# Patient Record
Sex: MALE | Race: WHITE | NOT HISPANIC OR LATINO | Employment: OTHER | URBAN - NONMETROPOLITAN AREA
[De-identification: names, ages, dates, MRNs, and addresses within clinical notes are randomized per-mention and may not be internally consistent; named-entity substitution may affect disease eponyms.]

---

## 2017-08-16 ENCOUNTER — TRANSCRIBE ORDERS (OUTPATIENT)
Dept: ADMINISTRATIVE | Facility: HOSPITAL | Age: 62
End: 2017-08-16

## 2017-08-16 DIAGNOSIS — R07.9 CHEST PAIN, UNSPECIFIED: Primary | ICD-10-CM

## 2017-08-16 DIAGNOSIS — R93.89 ABNORMAL CXR: ICD-10-CM

## 2017-08-21 ENCOUNTER — HOSPITAL ENCOUNTER (OUTPATIENT)
Dept: CT IMAGING | Facility: HOSPITAL | Age: 62
Discharge: HOME | End: 2017-08-21
Attending: INTERNAL MEDICINE | Admitting: INTERNAL MEDICINE

## 2017-08-21 DIAGNOSIS — R93.89 ABNORMAL CXR: ICD-10-CM

## 2017-08-21 PROCEDURE — 71250 CT THORAX DX C-: CPT

## 2017-08-21 PROCEDURE — 71250 CT THORAX DX C-: CPT | Performed by: RADIOLOGY

## 2017-08-30 ENCOUNTER — HOSPITAL ENCOUNTER (OUTPATIENT)
Dept: NUCLEAR MEDICINE | Facility: HOSPITAL | Age: 62
Discharge: HOME | End: 2017-08-30
Attending: INTERNAL MEDICINE

## 2017-08-30 ENCOUNTER — HOSPITAL ENCOUNTER (OUTPATIENT)
Dept: CARDIOLOGY | Facility: HOSPITAL | Age: 62
Discharge: HOME | End: 2017-08-30
Attending: INTERNAL MEDICINE

## 2017-08-30 VITALS — WEIGHT: 218 LBS | BODY MASS INDEX: 28.89 KG/M2 | HEIGHT: 73 IN

## 2017-08-30 DIAGNOSIS — R07.9 CHEST PAIN, UNSPECIFIED: ICD-10-CM

## 2017-08-30 PROCEDURE — 93306 TTE W/DOPPLER COMPLETE: CPT | Performed by: INTERNAL MEDICINE

## 2017-08-30 PROCEDURE — A9500 TC99M SESTAMIBI: HCPCS | Performed by: INTERNAL MEDICINE

## 2017-08-30 PROCEDURE — 0 TECHNETIUM SESTAMIBI: Performed by: INTERNAL MEDICINE

## 2017-08-30 PROCEDURE — 93306 TTE W/DOPPLER COMPLETE: CPT

## 2017-08-30 PROCEDURE — 78452 HT MUSCLE IMAGE SPECT MULT: CPT | Performed by: INTERNAL MEDICINE

## 2017-08-30 PROCEDURE — 93017 CV STRESS TEST TRACING ONLY: CPT

## 2017-08-30 PROCEDURE — 93018 CV STRESS TEST I&R ONLY: CPT | Performed by: INTERNAL MEDICINE

## 2017-08-30 PROCEDURE — 78452 HT MUSCLE IMAGE SPECT MULT: CPT

## 2017-08-30 RX ORDER — TRAMADOL HYDROCHLORIDE 50 MG/1
50 TABLET ORAL EVERY 6 HOURS PRN
COMMUNITY

## 2017-08-30 RX ORDER — LISINOPRIL 20 MG/1
20 TABLET ORAL DAILY
COMMUNITY

## 2017-08-30 RX ADMIN — TECHNETIUM TC-99M SESTAMIBI 1 DOSE: 1 INJECTION INTRAVENOUS at 08:25

## 2017-08-30 RX ADMIN — TECHNETIUM TC-99M SESTAMIBI 1 DOSE: 1 INJECTION INTRAVENOUS at 09:55

## 2017-08-31 LAB
BH CV ECHO MEAS - % IVS THICK: 60 %
BH CV ECHO MEAS - % LVPW THICK: 51.9 %
BH CV ECHO MEAS - ACS: 2.1 CM
BH CV ECHO MEAS - AO ROOT AREA (BSA CORRECTED): 1.6
BH CV ECHO MEAS - AO ROOT AREA: 10.6 CM^2
BH CV ECHO MEAS - AO ROOT DIAM: 3.7 CM
BH CV ECHO MEAS - BSA(HAYCOCK): 2.3 M^2
BH CV ECHO MEAS - BSA: 2.2 M^2
BH CV ECHO MEAS - BZI_BMI: 28.8 KILOGRAMS/M^2
BH CV ECHO MEAS - BZI_METRIC_HEIGHT: 185.4 CM
BH CV ECHO MEAS - BZI_METRIC_WEIGHT: 98.9 KG
BH CV ECHO MEAS - CONTRAST EF 4CH: 50 ML/M^2
BH CV ECHO MEAS - EDV(CUBED): 131.2 ML
BH CV ECHO MEAS - EDV(MOD-SP4): 102 ML
BH CV ECHO MEAS - EDV(TEICH): 122.8 ML
BH CV ECHO MEAS - EF(CUBED): 80.6 %
BH CV ECHO MEAS - EF(MOD-SP4): 50 %
BH CV ECHO MEAS - EF(TEICH): 72.8 %
BH CV ECHO MEAS - ESV(CUBED): 25.5 ML
BH CV ECHO MEAS - ESV(MOD-SP4): 51 ML
BH CV ECHO MEAS - ESV(TEICH): 33.4 ML
BH CV ECHO MEAS - FS: 42.1 %
BH CV ECHO MEAS - IVS/LVPW: 0.93
BH CV ECHO MEAS - IVSD: 1 CM
BH CV ECHO MEAS - IVSS: 1.6 CM
BH CV ECHO MEAS - LA DIMENSION: 3.6 CM
BH CV ECHO MEAS - LA/AO: 0.98
BH CV ECHO MEAS - LV DIASTOLIC VOL/BSA (35-75): 45.7 ML/M^2
BH CV ECHO MEAS - LV MASS(C)D: 199.2 GRAMS
BH CV ECHO MEAS - LV MASS(C)DI: 89.3 GRAMS/M^2
BH CV ECHO MEAS - LV MASS(C)S: 178.7 GRAMS
BH CV ECHO MEAS - LV MASS(C)SI: 80.1 GRAMS/M^2
BH CV ECHO MEAS - LV SYSTOLIC VOL/BSA (12-30): 22.8 ML/M^2
BH CV ECHO MEAS - LVIDD: 5.1 CM
BH CV ECHO MEAS - LVIDS: 2.9 CM
BH CV ECHO MEAS - LVLD AP4: 9.9 CM
BH CV ECHO MEAS - LVLS AP4: 8.8 CM
BH CV ECHO MEAS - LVOT AREA (M): 4.2 CM^2
BH CV ECHO MEAS - LVOT AREA: 4 CM^2
BH CV ECHO MEAS - LVOT DIAM: 2.3 CM
BH CV ECHO MEAS - LVPWD: 1.1 CM
BH CV ECHO MEAS - LVPWS: 1.7 CM
BH CV ECHO MEAS - MV A MAX VEL: 76 CM/SEC
BH CV ECHO MEAS - MV E MAX VEL: 91.3 CM/SEC
BH CV ECHO MEAS - MV E/A: 1.2
BH CV ECHO MEAS - PA ACC SLOPE: 593.4 CM/SEC^2
BH CV ECHO MEAS - PA ACC TIME: 0.14 SEC
BH CV ECHO MEAS - PA PR(ACCEL): 14 MMHG
BH CV ECHO MEAS - RAP SYSTOLE: 10 MMHG
BH CV ECHO MEAS - RVDD: 1 CM
BH CV ECHO MEAS - RVSP: 35 MMHG
BH CV ECHO MEAS - SI(CUBED): 47.4 ML/M^2
BH CV ECHO MEAS - SI(MOD-SP4): 22.8 ML/M^2
BH CV ECHO MEAS - SI(TEICH): 40 ML/M^2
BH CV ECHO MEAS - SV(CUBED): 105.7 ML
BH CV ECHO MEAS - SV(MOD-SP4): 51 ML
BH CV ECHO MEAS - SV(TEICH): 89.4 ML
BH CV ECHO MEAS - TR MAX VEL: 249.8 CM/SEC
BH CV NUCLEAR PRIOR STUDY: 3
BH CV STRESS BP STAGE 1: NORMAL
BH CV STRESS BP STAGE 2: NORMAL
BH CV STRESS BP STAGE 3: NORMAL
BH CV STRESS DURATION MIN STAGE 1: 3
BH CV STRESS DURATION MIN STAGE 2: 3
BH CV STRESS DURATION MIN STAGE 3: 3
BH CV STRESS DURATION SEC STAGE 1: 0
BH CV STRESS DURATION SEC STAGE 2: 0
BH CV STRESS DURATION SEC STAGE 3: 0
BH CV STRESS GRADE STAGE 1: 10
BH CV STRESS GRADE STAGE 2: 12
BH CV STRESS GRADE STAGE 3: 14
BH CV STRESS HR STAGE 1: 108
BH CV STRESS HR STAGE 2: 124
BH CV STRESS HR STAGE 3: 142
BH CV STRESS METS STAGE 1: 5
BH CV STRESS METS STAGE 2: 7.5
BH CV STRESS METS STAGE 3: 10
BH CV STRESS PROTOCOL 1: NORMAL
BH CV STRESS RECOVERY BP: NORMAL MMHG
BH CV STRESS RECOVERY HR: 96 BPM
BH CV STRESS SPEED STAGE 1: 1.7
BH CV STRESS SPEED STAGE 2: 2.5
BH CV STRESS SPEED STAGE 3: 3.4
BH CV STRESS STAGE 1: 1
BH CV STRESS STAGE 2: 2
BH CV STRESS STAGE 3: 3
LV EF 2D ECHO EST: 55 %
LV EF NUC BP: 66 %
MAXIMAL PREDICTED HEART RATE: 160 BPM
PERCENT MAX PREDICTED HR: 88.75 %
STRESS BASELINE BP: NORMAL MMHG
STRESS BASELINE HR: 92 BPM
STRESS PERCENT HR: 104 %
STRESS POST ESTIMATED WORKLOAD: 10.1 METS
STRESS POST EXERCISE DUR MIN: 7 MIN
STRESS POST EXERCISE DUR SEC: 30 SEC
STRESS POST PEAK BP: NORMAL MMHG
STRESS POST PEAK HR: 142 BPM
STRESS TARGET HR: 136 BPM

## 2017-09-13 ENCOUNTER — TRANSCRIBE ORDERS (OUTPATIENT)
Dept: ADMINISTRATIVE | Facility: HOSPITAL | Age: 62
End: 2017-09-13

## 2017-09-13 DIAGNOSIS — R93.89 ABNORMAL CXR (CHEST X-RAY): Primary | ICD-10-CM

## 2017-10-16 ENCOUNTER — HOSPITAL ENCOUNTER (OUTPATIENT)
Dept: RESPIRATORY THERAPY | Facility: HOSPITAL | Age: 62
Discharge: HOME | End: 2017-10-16
Attending: INTERNAL MEDICINE | Admitting: INTERNAL MEDICINE

## 2017-10-16 DIAGNOSIS — R93.89 ABNORMAL CXR (CHEST X-RAY): ICD-10-CM

## 2017-10-16 PROCEDURE — 94727 GAS DIL/WSHOT DETER LNG VOL: CPT | Performed by: INTERNAL MEDICINE

## 2017-10-16 PROCEDURE — 94729 DIFFUSING CAPACITY: CPT | Performed by: INTERNAL MEDICINE

## 2017-10-16 PROCEDURE — 94727 GAS DIL/WSHOT DETER LNG VOL: CPT

## 2017-10-16 PROCEDURE — 94729 DIFFUSING CAPACITY: CPT

## 2017-10-16 PROCEDURE — 94060 EVALUATION OF WHEEZING: CPT | Performed by: INTERNAL MEDICINE

## 2017-10-16 PROCEDURE — 94060 EVALUATION OF WHEEZING: CPT

## 2020-09-08 ENCOUNTER — TRANSCRIBE ORDERS (OUTPATIENT)
Dept: ADMINISTRATIVE | Facility: HOSPITAL | Age: 65
End: 2020-09-08

## 2020-09-08 DIAGNOSIS — Z11.59 SPECIAL SCREENING EXAMINATION FOR UNSPECIFIED VIRAL DISEASE: Primary | ICD-10-CM

## 2020-09-09 ENCOUNTER — LAB (OUTPATIENT)
Dept: LAB | Facility: HOSPITAL | Age: 65
End: 2020-09-09

## 2020-09-09 DIAGNOSIS — Z11.59 SPECIAL SCREENING EXAMINATION FOR UNSPECIFIED VIRAL DISEASE: ICD-10-CM

## 2020-09-09 LAB — SARS-COV-2 RNA NOSE QL NAA+PROBE: NOT DETECTED

## 2020-09-09 PROCEDURE — C9803 HOPD COVID-19 SPEC COLLECT: HCPCS

## 2020-09-09 PROCEDURE — U0004 COV-19 TEST NON-CDC HGH THRU: HCPCS

## 2021-03-19 ENCOUNTER — IMMUNIZATION (OUTPATIENT)
Dept: VACCINE CLINIC | Facility: HOSPITAL | Age: 66
End: 2021-03-19

## 2021-03-19 PROCEDURE — 0001A: CPT | Performed by: INTERNAL MEDICINE

## 2021-03-19 PROCEDURE — 91300 HC SARSCOV02 VAC 30MCG/0.3ML IM: CPT | Performed by: INTERNAL MEDICINE

## 2021-04-09 ENCOUNTER — IMMUNIZATION (OUTPATIENT)
Dept: VACCINE CLINIC | Facility: HOSPITAL | Age: 66
End: 2021-04-09

## 2021-04-09 PROCEDURE — 91300 HC SARSCOV02 VAC 30MCG/0.3ML IM: CPT | Performed by: INTERNAL MEDICINE

## 2021-04-09 PROCEDURE — 0002A: CPT | Performed by: INTERNAL MEDICINE

## 2022-04-04 ENCOUNTER — TRANSCRIBE ORDERS (OUTPATIENT)
Dept: ADMINISTRATIVE | Facility: HOSPITAL | Age: 67
End: 2022-04-04

## 2022-04-04 DIAGNOSIS — R01.1 CARDIAC MURMUR: Primary | ICD-10-CM

## 2022-04-08 ENCOUNTER — HOSPITAL ENCOUNTER (OUTPATIENT)
Dept: GENERAL RADIOLOGY | Facility: HOSPITAL | Age: 67
Discharge: HOME | End: 2022-04-08

## 2022-04-08 ENCOUNTER — TRANSCRIBE ORDERS (OUTPATIENT)
Dept: ADMINISTRATIVE | Facility: HOSPITAL | Age: 67
End: 2022-04-08

## 2022-04-08 ENCOUNTER — HOSPITAL ENCOUNTER (OUTPATIENT)
Dept: CARDIOLOGY | Facility: HOSPITAL | Age: 67
Discharge: HOME | End: 2022-04-08

## 2022-04-08 DIAGNOSIS — R09.1 PLEURISY: Primary | ICD-10-CM

## 2022-04-08 DIAGNOSIS — R09.1 PLEURISY: ICD-10-CM

## 2022-04-08 DIAGNOSIS — R01.1 CARDIAC MURMUR: ICD-10-CM

## 2022-04-08 LAB
BH CV ECHO MEAS - ACS: 2 CM
BH CV ECHO MEAS - AO MAX PG: 10.8 MMHG
BH CV ECHO MEAS - AO MEAN PG: 6 MMHG
BH CV ECHO MEAS - AO ROOT DIAM: 3.3 CM
BH CV ECHO MEAS - AO V2 MAX: 164 CM/SEC
BH CV ECHO MEAS - AO V2 VTI: 39.5 CM
BH CV ECHO MEAS - EDV(CUBED): 92 ML
BH CV ECHO MEAS - EDV(MOD-SP4): 88.8 ML
BH CV ECHO MEAS - EF(MOD-SP4): 53.5 %
BH CV ECHO MEAS - ESV(CUBED): 29.1 ML
BH CV ECHO MEAS - ESV(MOD-SP4): 41.3 ML
BH CV ECHO MEAS - FS: 31.9 %
BH CV ECHO MEAS - IVS/LVPW: 1.1 CM
BH CV ECHO MEAS - IVSD: 1.23 CM
BH CV ECHO MEAS - LA DIMENSION: 3.4 CM
BH CV ECHO MEAS - LAT PEAK E' VEL: 10 CM/SEC
BH CV ECHO MEAS - LV DIASTOLIC VOL/BSA (35-75): 39.8 CM2
BH CV ECHO MEAS - LV MASS(C)D: 193.2 GRAMS
BH CV ECHO MEAS - LV SYSTOLIC VOL/BSA (12-30): 18.5 CM2
BH CV ECHO MEAS - LVIDD: 4.5 CM
BH CV ECHO MEAS - LVIDS: 3.1 CM
BH CV ECHO MEAS - LVOT AREA: 4.2 CM2
BH CV ECHO MEAS - LVOT DIAM: 2.3 CM
BH CV ECHO MEAS - LVPWD: 1.12 CM
BH CV ECHO MEAS - MED PEAK E' VEL: 8.8 CM/SEC
BH CV ECHO MEAS - MV A MAX VEL: 104 CM/SEC
BH CV ECHO MEAS - MV E MAX VEL: 121 CM/SEC
BH CV ECHO MEAS - MV E/A: 1.16
BH CV ECHO MEAS - PA ACC TIME: 0.11 SEC
BH CV ECHO MEAS - PA PR(ACCEL): 31.3 MMHG
BH CV ECHO MEAS - RAP SYSTOLE: 10 MMHG
BH CV ECHO MEAS - RVSP: 40.5 MMHG
BH CV ECHO MEAS - SI(MOD-SP4): 21.3 ML/M2
BH CV ECHO MEAS - SV(MOD-SP4): 47.5 ML
BH CV ECHO MEAS - TAPSE (>1.6): 3 CM
BH CV ECHO MEAS - TR MAX PG: 30.5 MMHG
BH CV ECHO MEAS - TR MAX VEL: 275.7 CM/SEC
BH CV ECHO MEASUREMENTS AVERAGE E/E' RATIO: 12.87
BH CV LEA LEFT PERONEAL  DISTAL EDV: 3.4 CM/S
BH CV LEA LEFT PERONEAL  DISTAL PSV: 3.4 CM/S
BH CV LEA LEFT PERONEAL  MID EDV: 3.4 CM/S
BH CV LEA LEFT PERONEAL  MID PSV: 3.4 CM/S
BH CV LEA LEFT PERONEAL  PROX EDV: 3.4 CM/S
BH CV LEA LEFT PERONEAL  PROX PSV: 3.4 CM/S
BH CV LEA LEFT PTA DISTAL EDV: 3.4 CM/S
BH CV LEA LEFT PTA DISTAL PSV: 3.4 CM/S
BH CV LEA LEFT PTA MID EDV: 3.4 CM/S
BH CV LEA LEFT PTA MID PSV: 3.4 CM/S
BH CV LEA LEFT PTA PROX EDV: 3.4 CM/S
BH CV LEA LEFT PTA PROX PSV: 3.4 CM/S
LEFT ATRIUM VOLUME INDEX: 21.7 ML/M2
LV EF 2D ECHO EST: 60 %
MAXIMAL PREDICTED HEART RATE: 155 BPM
STRESS TARGET HR: 132 BPM

## 2022-04-08 PROCEDURE — 93306 TTE W/DOPPLER COMPLETE: CPT | Performed by: INTERNAL MEDICINE

## 2022-04-08 PROCEDURE — 93306 TTE W/DOPPLER COMPLETE: CPT

## 2022-04-08 PROCEDURE — 71046 X-RAY EXAM CHEST 2 VIEWS: CPT | Performed by: RADIOLOGY

## 2022-04-08 PROCEDURE — 71046 X-RAY EXAM CHEST 2 VIEWS: CPT

## 2023-01-03 ENCOUNTER — TRANSCRIBE ORDERS (OUTPATIENT)
Dept: ADMINISTRATIVE | Facility: HOSPITAL | Age: 68
End: 2023-01-03
Payer: COMMERCIAL

## 2023-01-03 DIAGNOSIS — J60 BLACK LUNG DISEASE: Primary | ICD-10-CM

## 2023-01-13 ENCOUNTER — APPOINTMENT (OUTPATIENT)
Dept: RESPIRATORY THERAPY | Facility: HOSPITAL | Age: 68
End: 2023-01-13
Payer: COMMERCIAL

## 2023-10-02 ENCOUNTER — TRANSCRIBE ORDERS (OUTPATIENT)
Dept: ADMINISTRATIVE | Facility: HOSPITAL | Age: 68
End: 2023-10-02
Payer: COMMERCIAL

## 2023-10-02 DIAGNOSIS — N42.89 PROSTATIC MASS: Primary | ICD-10-CM

## 2023-10-14 ENCOUNTER — HOSPITAL ENCOUNTER (OUTPATIENT)
Dept: MRI IMAGING | Facility: HOSPITAL | Age: 68
Discharge: HOME | End: 2023-10-14
Admitting: INTERNAL MEDICINE
Payer: MEDICARE

## 2023-10-14 DIAGNOSIS — N42.89 PROSTATIC MASS: ICD-10-CM

## 2023-10-14 PROCEDURE — A9577 INJ MULTIHANCE: HCPCS | Performed by: INTERNAL MEDICINE

## 2023-10-14 PROCEDURE — 0 GADOBENATE DIMEGLUMINE 529 MG/ML SOLUTION: Performed by: INTERNAL MEDICINE

## 2023-10-14 PROCEDURE — 72197 MRI PELVIS W/O & W/DYE: CPT

## 2023-10-14 RX ADMIN — GADOBENATE DIMEGLUMINE 18 ML: 529 INJECTION, SOLUTION INTRAVENOUS at 18:59

## 2024-01-04 ENCOUNTER — OFFICE VISIT (OUTPATIENT)
Dept: RADIATION ONCOLOGY | Facility: HOSPITAL | Age: 69
End: 2024-01-04
Payer: MEDICARE

## 2024-01-04 ENCOUNTER — HOSPITAL ENCOUNTER (OUTPATIENT)
Dept: RADIATION ONCOLOGY | Facility: HOSPITAL | Age: 69
Setting detail: RADIATION/ONCOLOGY SERIES
Discharge: HOME | End: 2024-01-04
Payer: MEDICARE

## 2024-01-04 VITALS
TEMPERATURE: 97.9 F | BODY MASS INDEX: 27.23 KG/M2 | OXYGEN SATURATION: 95 % | HEART RATE: 75 BPM | SYSTOLIC BLOOD PRESSURE: 162 MMHG | RESPIRATION RATE: 16 BRPM | DIASTOLIC BLOOD PRESSURE: 78 MMHG | WEIGHT: 205.5 LBS | HEIGHT: 73 IN

## 2024-01-04 DIAGNOSIS — C61 PROSTATE CANCER: Primary | ICD-10-CM

## 2024-01-04 RX ORDER — TELMISARTAN 20 MG/1
1 TABLET ORAL DAILY
COMMUNITY
Start: 2023-09-29

## 2024-01-04 RX ORDER — ROSUVASTATIN CALCIUM 20 MG/1
1 TABLET, COATED ORAL 2 TIMES WEEKLY
COMMUNITY
Start: 2023-09-29

## 2024-01-04 RX ORDER — TAMSULOSIN HYDROCHLORIDE 0.4 MG/1
1 CAPSULE ORAL DAILY
COMMUNITY
Start: 2023-09-29

## 2024-01-04 NOTE — PROGRESS NOTES
CONSULTATION NOTE    NAME:      Bereket Andrade  :                                                          1955  DATE OF CONSULTATION:                       24  REQUESTING PHYSICIAN:                   TIMOTHY Bello MD  REASON FOR CONSULTATION:           Further evaluation and management of the patient's adenocarcinoma of the prostate cT1 cN0 M0 [place] 3+4 equal 7 pretreatment PSA 4.8 for consideration of radiation treatments at the request of Dr. Bello.         BRIEF HISTORY:  Bereket Andrade  is a very pleasant 68 y.o. male who was found to have an elevated PSA that was increasing over time.  The patient had PI-RADS MRI scan that showed a lesion at the right apex concerning for PI-RADS 4 lesion.  Patient gland was however 85 g. the patient had a biopsy that came back initially is 1 in 12 cores positive for [place] 4+4 = 8.  The patient's pathology was sent for GPS score was found to be 15.  The patient's pathology was sent for review given the discordant genomic risk and pathology.  The patient was then referred to our clinic to consider indicated treatments.    Patient reports that he does have some flow issues and does have some issues with frequency and hesitancy/intermittency.    The patient does not require medications for erectile dysfunction.  The patient has no issues with his GI tract.    The patient is very anxious and has done a considerable amount of research regarding his diagnosis and treatment options  BMI:  Body mass index is 27.11 kg/m².      Social History     Substance and Sexual Activity   Alcohol Use No       Allergies   Allergen Reactions    Contrast Dye (Echo Or Unknown Ct/Mr)        Social History     Tobacco Use    Smoking status: Former    Smokeless tobacco: Never    Tobacco comments:     Quit    Substance Use Topics    Alcohol use: No    Drug use: No         Past Medical History:   Diagnosis Date    Hyperlipidemia     Hypertension     Prostate cancer   "      family history is not on file.     Past Surgical History:   Procedure Laterality Date    COLONOSCOPY  2020    OTHER SURGICAL HISTORY      knee surgery    OTHER SURGICAL HISTORY      fravtured jaw    OTHER SURGICAL HISTORY  1990    RK (laser surgery)        Review of Systems   Respiratory:  Positive for shortness of breath.     A full 14 point review of systems was performed and was negative except as noted in the HPI.         Objective   VITAL SIGNS:   Vitals:    01/04/24 1050   BP: 162/78   Pulse: 75   Resp: 16   Temp: 97.9 °F (36.6 °C)   TempSrc: Temporal   SpO2: 95%   Weight: 93.2 kg (205 lb 8 oz)   Height: 185.4 cm (73\")   PainSc: 0-No pain      IPSS Questionnaire (AUA-7):  Over the past month…    1)  Incomplete Emptying  How often have you had a sensation of not emptying your bladder?  3 - About half the time   2)  Frequency  How often have you had to urinate less than every two hours? 3 - About half the time   3)  Intermittency  How often have you found you stopped and started again several times when you urinated?  2 - Less than half the time   4) Urgency  How often have you found it difficult to postpone urination?  1 - Less than 1 time in 5   5) Weak Stream  How often have you had a weak urinary stream?  1 - Less than 1 time in 5   6) Straining  How often have you had to push or strain to begin urination?  0 - Not at all   7) Nocturia  How many times did you typically get up at night to urinate?  3 - 3 times   Total Score:  13       Quality of life due to urinary symptoms:  If you were to spend the rest of your life with your urinary condition the way it is now, how would you feel about that? 2-Mostly Satisfied   Urine Leakage (Incontinence) 0-No Leakage     Sexual Health Inventory  Current Status    1)  How do you rate your confidence that you could achieve and keep an erection? 5-Very High   2) When you had erections with sexual stimulation, how often were your erections hard enough for penetration " (entering your partner)? 5-Almost always or always   3)  During sexual intercourse, how often were you able to maintain your erection after you had penetrated (entered) into your partner? 5-Almost always or always   4) During sexual intercourse, how difficult was it to maintain your erection to completion of intercourse? 5-Not difficult   5) When you attempted sexual intercourse, how often was it satisfactory to you? 5-Almost always or always   Total Score: 25       Bowel Health Inventory  Current Status: 0-No problems, no rectal bleeding, no discharge, less then 5 bowel movements a day            KPS       90%    Physical Exam  Constitutional:       General: He is not in acute distress.     Appearance: He is well-developed.   HENT:      Head: Normocephalic and atraumatic.   Eyes:      Conjunctiva/sclera: Conjunctivae normal.      Pupils: Pupils are equal, round, and reactive to light.   Neck:      Trachea: No tracheal deviation.   Pulmonary:      Effort: Pulmonary effort is normal. No respiratory distress.      Breath sounds: No wheezing.   Abdominal:      General: There is no distension.      Palpations: Abdomen is soft.   Genitourinary:     Comments: Deferred as previously performed by Dr. Bello  Musculoskeletal:         General: Normal range of motion.      Cervical back: Normal range of motion.   Skin:     General: Skin is warm and dry.   Neurological:      Mental Status: He is alert.      Cranial Nerves: No cranial nerve deficit.      Coordination: Coordination normal.   Psychiatric:         Behavior: Behavior normal.         Judgment: Judgment normal.              The following portions of the patient's history were reviewed and updated as appropriate: allergies, current medications, past family history, past medical history, past social history, past surgical history, and problem list.  I reviewed the patient's PSAs most recent one being 4.8  I reviewed the patient's MRI scan showing 85 cc gland with a  PI-RADS 4 lesions right apex but no evidence of extracapsular extension or depressed extension  Reviewed the patient's genetic score showing of GPS of 15.  I reviewed the patient's pathology from [place] showing Yola 4 before collate in 1 of 12 cores.  I reviewed the patient's pathology from [facility] showing [place] 3+4 equal 7 disease.    I reviewed Dr. Bello's notes.    Assessment      IMPRESSION:     Patient is a very pleasant 68-year-old gentleman with favorable intermediate risk adenocarcinoma of the prostate with a favorable genetic risk score but a relatively high risk appearing lesion on the PET scan.  The patient clinically has a cT1 cN0 M0 and a low PSA at 4.8.  Patient's trend has been slowly but inevitably increasing PSA.  We discussed in detail an exhaustive list of treatment options for the patient today.  We discussed surgery discussed risk benefits of surgery.  We discussed radiotherapy.  We discussed various types of radiotherapy including but not limited to long course radiotherapy, moderately hypofractionated radiotherapy, SBRT, brachytherapy, combination therapy.  We discussed radiotherapy in combination with ADT.  We discussed the acute and chronic side effects of radiation during treatment.  We compared and contrasted the different treatment styles.  We discussed the long-term side effects of radiotherapy.  We discussed how that may affect his flow and his urinary functioning.  We discussed the effect of radiation on his erectile function.  We compared and contrasted this with surgery.  We discussed ADT as it pertains to the population general.  We discussed ADT in his case.  We enumerated the situations where ADT could potentially helpful and does when it might not be  We discussed that Dr. Bello may be the final determination of what kind, how much and when he received his ADT.  We discussed ADT in combination with SBRT and how we sequence that.  We discussed how we sequence that with  his markers and fiducials and space O AR.  We discussed also how the pathology is obtained.  We discussed intra versus interobserver variability among pathologist.  We discussed the difference in the surgical data versus the radiation data based on the increased amount of pathological information gleaned from surgical excision versus radiation.  We discussed the risk of under sampling and being under staged.  We discussed the limitations pertaining to this sensitivity and specificity of MRI scans we discussed the difficulty of making decisions based on genetic risk scores are often validated in large clinical trials yet.    The patient does have an iodine allergy would not be a candidate for an iodine based space O AR but still received a noniodinated 1.  This is okay as the patient will be candidate for an MRI scan.      We discussed that if he is ultimately interested in SBRT and Dr. Bello thinks needs ADT that we will wait a few months to let his prostate shrink in size before starting the planning.  Ideally the patient will receive his fiducial markers and space O AR after being on ADT for at least a couple of months to allow for maximum downstaging and stability of gland size around the treatments.    We discussed posttreatment monitoring.  We discussed the role of PSA in the posttreatment setting.  We discussed the nuances related to that.  We went over PSA bounces, when they happen and what they mean.  We also discussed specifics related to his plan for follow-up.      I answered their questions related to his care specifically.    The patient would like to discuss his options again with Dr. Bello in light of his new lower pathology specifically as it pertains to ADT.  He is leaning towards SBRT with CyberKnife and will contact us after he considers his options further and makes decision that fits best for him and his risk profile and lifestyle.    In total I spent greater than 120 minutes in direct counseling  with the patient today.    RECOMMENDATIONS:      Adenocarcinoma the prostate  -Favorable intermediate risk  -CT 1 cN0 M0  -[place] 4+4 = 8 initially  -[place] 3+4 equal 7 from [facility]  -Pretreatment PSA 4.8 on 4K score  -PI-RADS MRI shows PI-RADS 4 lesion at the right apex  -GPS score 15  -Iodine allergy   -Not a candidate for contrasted space O AR   -Fortunately is a candidate for an MRI scan and would need MRI planning if this is placed  -The patient is ADT would recommend treatments of ADT prior to fiducial markers and space O AR   -Could then proceed with planning his usual thereafter  -Patient will call us when he reaches a decision  -Patient to get back in with Dr. Bello to discuss further what he would like to do for treatments       Eddie Stone MD      Errors in dictation may reflect use of voice recognition software and not all errors in transcription may have been detected prior to signing.

## 2024-01-05 PROBLEM — C61 PROSTATE CANCER: Status: ACTIVE | Noted: 2024-01-05

## 2024-03-18 DIAGNOSIS — C61 PROSTATE CANCER: Primary | ICD-10-CM

## 2024-03-20 ENCOUNTER — TELEPHONE (OUTPATIENT)
Dept: RADIATION ONCOLOGY | Facility: HOSPITAL | Age: 69
End: 2024-03-20
Payer: MEDICARE

## 2024-03-20 DIAGNOSIS — C61 PROSTATE CANCER: Primary | ICD-10-CM

## 2024-03-20 NOTE — TELEPHONE ENCOUNTER
You are scheduled for markers with Dr. Bello on 4/11/24, that office will send you all the necessary information for that appointment.    4/30/24 Start your gas reducing diet along with gasx with meals and at bedtime    5/2/24 Please arrive at Dr. Stone's office at 10am, do not eat or drink after midnight, do not wear any metal and you will need to do an enema before leaving home that morning.    You can plan to be here until approximately 1pm.  You will see Dr. Stone, receive a CT scan and then an MRI.    Radha our dietician will call you the week before to go over the diet and answer any additional questions.

## 2024-04-25 ENCOUNTER — DOCUMENTATION (OUTPATIENT)
Dept: NUTRITION | Facility: HOSPITAL | Age: 69
End: 2024-04-25
Payer: MEDICARE

## 2024-04-25 NOTE — PROGRESS NOTES
[facility] Nutrition    Attempted to reach patient via phone to review the Gas Elimination Diet and instructions in preparation for the imaging scans and CK treatment for prostate cancer.  No answer; VM left requesting return phone call.    9:20 am Phone consult with patient and wife regarding the Gas Elimination Diet and instructions in preparation for the imaging scans and CK treatment for prostate cancer.  Reviewed the rationale for the diet modification, gas forming foods, schedule for following, Gas X, enemas, NPO status x 6 hours prior to MRI and appointment times.  Patient and wife verbalized excellent comprehension of diet; all questions were addressed.

## 2024-04-29 ENCOUNTER — OFFICE VISIT (OUTPATIENT)
Dept: RADIATION ONCOLOGY | Facility: HOSPITAL | Age: 69
End: 2024-04-29
Payer: MEDICARE

## 2024-04-29 ENCOUNTER — HOSPITAL ENCOUNTER (OUTPATIENT)
Dept: RADIATION ONCOLOGY | Facility: HOSPITAL | Age: 69
Setting detail: RADIATION/ONCOLOGY SERIES
Discharge: HOME | End: 2024-04-29
Payer: MEDICARE

## 2024-04-29 DIAGNOSIS — C61 PROSTATE CANCER: Primary | ICD-10-CM

## 2024-04-29 NOTE — PROGRESS NOTES
RE-EVALUATION    PATIENT:                                                      Bereket Andrade  :                                                          1955  DATE:                          2024   DIAGNOSIS:     Prostate cancer  - Stage IIB (cT1c, cN0, cM0, PSA: 4.8, Grade Group: 2)      This visit has been converted to a telehealth virtual visit, the patient's preferred method for today's re-evaluation appointment.  Total time of discussion was 23 minutes.  The patient has given verbal consent.       BRIEF HISTORY:  The patient is a very pleasant 69 y.o. male who was found to have an elevated PSA that was increasing over time.  The patient had PI-RADS MRI scan that showed a lesion at the right apex concerning for PI-RADS 4 lesion.  Patient gland's was 85 g.  The patient had a biopsy that came back initially as 1 in 12 cores positive for [place] 4+4 = 8 disease.  The patient's genetic score showed GPS of 15.  The patient discussed possible consideration of neoadjuvant and concurrent short course androgen ablation with Dr. Bello.  The patient's pathology was sent for outside review with Dr. Brambila at [facility] given the discordant genomic risk and pathology.  Fortunately, the pathology was downstaged to [place] 3+4 = 7 disease, indicative of favorable intermediate risk disease.  The patient was referred to our clinic to consider indicated treatments.  Given the downstaging of the patient's pathology, he has opted to pursue monotherapy with a course of CyberKnife stereotactic body radiotherapy alone.  He has undergone placement of space O AR and gold seed fiducial markers with Dr. Bello on 2024 and presents today via telemedicine for reevaluation of his definitive prostate treatment.    The patient denies hematuria, dysuria, or change in voiding following placement of space O AR or fiducials.  He reports some stable urinary frequency and hesitancy/intermittency.  He is already on  daily tamsulosin.  He continues to deny issues with his bowels.  He does not require medications for erectile dysfunction.    He has done a lot of personal research and is anxious to get started on his treatments.         IPSS Questionnaire (AUA-7):  Over the past month…     1)  Incomplete Emptying  How often have you had a sensation of not emptying your bladder?  3 - About half the time   2)  Frequency  How often have you had to urinate less than every two hours? 3 - About half the time   3)  Intermittency  How often have you found you stopped and started again several times when you urinated?  2 - Less than half the time   4) Urgency  How often have you found it difficult to postpone urination?  1 - Less than 1 time in 5   5) Weak Stream  How often have you had a weak urinary stream?  1 - Less than 1 time in 5   6) Straining  How often have you had to push or strain to begin urination?  0 - Not at all   7) Nocturia  How many times did you typically get up at night to urinate?  3 - 3 times   Total Score:  13         Quality of life due to urinary symptoms:  If you were to spend the rest of your life with your urinary condition the way it is now, how would you feel about that? 2-Mostly Satisfied   Urine Leakage (Incontinence) 0-No Leakage      Sexual Health Inventory  Current Status     1)  How do you rate your confidence that you could achieve and keep an erection? 5-Very High   2) When you had erections with sexual stimulation, how often were your erections hard enough for penetration (entering your partner)? 5-Almost always or always   3)  During sexual intercourse, how often were you able to maintain your erection after you had penetrated (entered) into your partner? 5-Almost always or always   4) During sexual intercourse, how difficult was it to maintain your erection to completion of intercourse? 5-Not difficult   5) When you attempted sexual intercourse, how often was it satisfactory to you? 5-Almost always  or always   Total Score: 25         Bowel Health Inventory  Current Status: 0-No problems, no rectal bleeding, no discharge, less then 5 bowel movements a day      Allergies   Allergen Reactions    Contrast Dye (Echo Or Unknown Ct/Mr)        Review of Systems   Genitourinary:  Positive for frequency and nocturia.    All other systems reviewed and are negative.        KPS 90%      Physical Exam  Pulmonary:      Respirations even, unlabored. No audible wheezing or cough.  Neurological:      A+Ox4, conversant, answers questions appropriately.  Psychiatric:     Judgement, affect, and decision-making WNL.    Limited physical exam as visit was conducted remotely via telephone.          The following portions of the patient's history were reviewed and updated as appropriate: allergies, current medications, past family history, past medical history, past social history, past surgical history, and problem list.    Diagnoses and all orders for this visit:    Prostate cancer        IMPRESSION:  The patient is a very pleasant 69-year-old gentleman with favorable intermediate risk adenocarcinoma of the prostate with a favorable genetic risk score but a relatively high risk appearing lesion on the PET scan.  Outside pathology review downstaged the patient to [place] 3+4 = 7.  The patient clinically has a cT1 cN0 M0 and a low PSA at 4.8.  Patient's PSA trend has been slowly but inevitably increasing.    The patient was seen in initial consult on 1/5/2024 in our department and previously discussed definitive treatment options.  The patient and Dr. Stone previously discussed radiotherapy in combination with ADT which was also a prior discussion with the patient's urologist.  Ultimately, the patient further discussed treatment options with his urologist and elected to proceed with CyberKnife stereotactic body radiotherapy as monotherapy, without initiation of short course androgen ablation.    The patient has since undergone space O  AR placement and gold seed fiducial placement in preparation of SBRT.  He tolerated these procedures without complication.  He wishes to proceed with further planning and preparation of definitive SBRT.  He remains a good candidate for treatment.  He is scheduled to return later this week on 5/2/2024 for CT simulation and planning MRI of the pelvis.  The patient will need to sign informed consent when he returns this day.  The patient and I reviewed bowel preparation with low gas-forming diet and Gas-X prior to this appointment.  We discussed expected bowel prep while undergoing treatment.  We discussed physical activity restrictions.  We discussed potential side effects of radiation, including but not limited to potential urinary and bowel side effects.  The patient does have a grossly enlarged prostate and will benefit from continuing daily alpha-blocker throughout the course of treatment.  Anticipate a course of CyberKnife SBRT delivered to the prostate and seminal vesicles, 35 [place] in 5 fractions delivered on an every day treatment schedule.           RECOMMENDATIONS:      Adenocarcinoma of the prostate  -Favorable intermediate risk  -cT1 cN0 M0  -Pretreatment PSA 4.8 on 4K score  -PI-RADS MRI shows PI-RADS 4 lesion at the right apex  -GPS score 15  -[place] 4+4 = 8 initially  -[place] 3+4 = 7 downstaged from [facility]  -Previously discussed ADT with Dr. Bello and Dr. Stone  -Patient has elected to proceed with SBRT as monotherapy  -Status post fiducial markers and space O AR with Dr. Bello  -Recommend CT simulation for planning  -Recommend MRI pelvis for planning  -Recommend CyberKnife SBRT 35 Gray in 5 fractions to the prostate   -Anticipate QOD treatment schedule to mitigate potential urinary symptoms from his enlarged prostate  -Will need to obtain informed consent when he is here on 5/2/2024  -Will need follow-up with Dr. Bello after treatment      A total of 45 minutes was spent working on this  encounter, with 23 minutes in virtual communication with the patient via telephone, and the remainder of the time spent in reviewing the relevant history, records, available imaging, and for documentation.     Ca Pike, APRN

## 2024-05-02 ENCOUNTER — HOSPITAL ENCOUNTER (OUTPATIENT)
Dept: RADIATION ONCOLOGY | Facility: HOSPITAL | Age: 69
Setting detail: RADIATION/ONCOLOGY SERIES
Discharge: HOME | End: 2024-05-02
Payer: MEDICARE

## 2024-05-02 ENCOUNTER — HOSPITAL ENCOUNTER (OUTPATIENT)
Dept: RADIATION ONCOLOGY | Facility: HOSPITAL | Age: 69
Discharge: HOME | End: 2024-05-02

## 2024-05-02 ENCOUNTER — HOSPITAL ENCOUNTER (OUTPATIENT)
Dept: MRI IMAGING | Facility: HOSPITAL | Age: 69
Discharge: HOME | End: 2024-05-02
Admitting: RADIOLOGY
Payer: MEDICARE

## 2024-05-02 DIAGNOSIS — C61 PROSTATE CANCER: ICD-10-CM

## 2024-05-02 PROCEDURE — 72195 MRI PELVIS W/O DYE: CPT

## 2024-05-02 PROCEDURE — 77399 UNLISTED PX MED RADJ PHYSICS: CPT | Performed by: RADIOLOGY

## 2024-05-08 PROCEDURE — 77338 DESIGN MLC DEVICE FOR IMRT: CPT | Performed by: RADIOLOGY

## 2024-05-08 PROCEDURE — 77300 RADIATION THERAPY DOSE PLAN: CPT | Performed by: RADIOLOGY

## 2024-05-08 PROCEDURE — 77301 RADIOTHERAPY DOSE PLAN IMRT: CPT | Performed by: RADIOLOGY

## 2024-05-09 PROCEDURE — 77301 RADIOTHERAPY DOSE PLAN IMRT: CPT | Performed by: RADIOLOGY

## 2024-05-09 PROCEDURE — 77300 RADIATION THERAPY DOSE PLAN: CPT | Performed by: RADIOLOGY

## 2024-05-09 PROCEDURE — 77338 DESIGN MLC DEVICE FOR IMRT: CPT | Performed by: RADIOLOGY

## 2024-05-14 ENCOUNTER — HOSPITAL ENCOUNTER (OUTPATIENT)
Dept: RADIATION ONCOLOGY | Facility: HOSPITAL | Age: 69
Discharge: HOME | End: 2024-05-14

## 2024-05-14 PROCEDURE — 77373 STRTCTC BDY RAD THER TX DLVR: CPT | Performed by: RADIOLOGY

## 2024-05-16 ENCOUNTER — HOSPITAL ENCOUNTER (OUTPATIENT)
Dept: RADIATION ONCOLOGY | Facility: HOSPITAL | Age: 69
Setting detail: RADIATION/ONCOLOGY SERIES
Discharge: HOME | End: 2024-05-16
Payer: MEDICARE

## 2024-05-16 DIAGNOSIS — C61 PROSTATE CANCER: Primary | ICD-10-CM

## 2024-05-16 DIAGNOSIS — C61 PROSTATE CANCER: ICD-10-CM

## 2024-05-16 LAB
BILIRUB UR QL STRIP: NEGATIVE
CLARITY UR: CLEAR
COLOR UR: YELLOW
GLUCOSE UR STRIP-MCNC: NEGATIVE MG/DL
HGB UR QL STRIP.AUTO: NEGATIVE
HOLD SPECIMEN: NORMAL
KETONES UR QL STRIP: NEGATIVE
LEUKOCYTE ESTERASE UR QL STRIP.AUTO: NEGATIVE
NITRITE UR QL STRIP: NEGATIVE
PH UR STRIP.AUTO: 7 [PH] (ref 5–8)
PROT UR QL STRIP: NEGATIVE
SP GR UR STRIP: 1.01 (ref 1–1.03)
UROBILINOGEN UR QL STRIP: NORMAL

## 2024-05-16 PROCEDURE — 81003 URINALYSIS AUTO W/O SCOPE: CPT | Performed by: RADIOLOGY

## 2024-05-16 PROCEDURE — 77373 STRTCTC BDY RAD THER TX DLVR: CPT | Performed by: RADIOLOGY

## 2024-05-21 ENCOUNTER — HOSPITAL ENCOUNTER (OUTPATIENT)
Dept: RADIATION ONCOLOGY | Facility: HOSPITAL | Age: 69
Setting detail: RADIATION/ONCOLOGY SERIES
Discharge: HOME | End: 2024-05-21
Payer: MEDICARE

## 2024-05-21 PROCEDURE — 77373 STRTCTC BDY RAD THER TX DLVR: CPT | Performed by: RADIOLOGY

## 2024-05-21 RX ORDER — DEXAMETHASONE 2 MG/1
2 TABLET ORAL 2 TIMES DAILY WITH MEALS
Qty: 30 TABLET | Refills: 0 | Status: SHIPPED | OUTPATIENT
Start: 2024-05-21

## 2024-05-24 ENCOUNTER — HOSPITAL ENCOUNTER (OUTPATIENT)
Dept: RADIATION ONCOLOGY | Facility: HOSPITAL | Age: 69
Setting detail: RADIATION/ONCOLOGY SERIES
Discharge: HOME | End: 2024-05-24
Payer: MEDICARE

## 2024-05-24 PROCEDURE — 77373 STRTCTC BDY RAD THER TX DLVR: CPT | Performed by: RADIOLOGY

## 2024-05-28 ENCOUNTER — HOSPITAL ENCOUNTER (OUTPATIENT)
Dept: RADIATION ONCOLOGY | Facility: HOSPITAL | Age: 69
Setting detail: RADIATION/ONCOLOGY SERIES
Discharge: HOME | End: 2024-05-28
Payer: MEDICARE

## 2024-05-28 PROCEDURE — 77373 STRTCTC BDY RAD THER TX DLVR: CPT | Performed by: RADIOLOGY

## 2024-05-28 PROCEDURE — 77336 RADIATION PHYSICS CONSULT: CPT | Performed by: RADIOLOGY

## 2024-12-05 ENCOUNTER — CLINICAL SUPPORT (OUTPATIENT)
Dept: RADIATION ONCOLOGY | Facility: HOSPITAL | Age: 69
End: 2024-12-05
Payer: MEDICARE

## 2024-12-05 ENCOUNTER — HOSPITAL ENCOUNTER (OUTPATIENT)
Dept: RADIATION ONCOLOGY | Facility: HOSPITAL | Age: 69
Setting detail: RADIATION/ONCOLOGY SERIES
Discharge: HOME | End: 2024-12-05
Payer: MEDICARE

## 2024-12-05 DIAGNOSIS — C61 PROSTATE CANCER: Primary | ICD-10-CM

## 2024-12-05 NOTE — PROGRESS NOTES
FU note    NAME:      Bereket Andrade  :                                                          1955  DATE OF CONSULTATION:                       24  REQUESTING PHYSICIAN:                   TIMOTHY Bello MD  REASON FOR CONSULTATION:           Further management of the patient's adenocarcinoma of the prostate cT1 cN0 M0 [place] 3+4 equal 7 pretreatment PSA 4.8.    This was a telehealth visit today per the patient request.  The patient preferred method of communication was the phone.        BRIEF HISTORY:  Bereket Andrade  is a very pleasant 69 y.o. male who was found to have an elevated PSA that was increasing over time.  The patient had PI-RADS MRI scan that showed a lesion at the right apex concerning for PI-RADS 4 lesion.  Patient gland was however 85 g. the patient had a biopsy that came back initially is 1 in 12 cores positive for Yola 4+4 = 8.  The patient's pathology was sent for GPS score was found to be 15.  The patient's pathology was sent for review given the discordant genomic risk and pathology.  His review at [facility] came back Callaway 3+4 equal 7.    The patient completed CyberKnife treatments 2024.  He had his first PSA check 2024 and his PSA was already down to 1.3.  The patient reports that he is having a fair amount of frequency at night that is bothersome to him.  He was previously taking Flomax twice daily but is backed off of that.  He is now taking Flomax in the morning.  He reports also that he drinks a fair amount of fluid up until time point that he goes to sleep.  The patient is also had some illnesses recently.  Plans a follow-up with Dr. Bello in March.      The patient has no issues with his GI tract.      BMI:  There is no height or weight on file to calculate BMI.      Social History     Substance and Sexual Activity   Alcohol Use No       Allergies   Allergen Reactions    Contrast Dye (Echo Or Unknown Ct/Mr)        Social History     Tobacco Use     Smoking status: Former    Smokeless tobacco: Never    Tobacco comments:     Quit 2007   Substance Use Topics    Alcohol use: No    Drug use: No         Past Medical History:   Diagnosis Date    Hyperlipidemia     Hypertension     Prostate cancer        family history is not on file.     Past Surgical History:   Procedure Laterality Date    COLONOSCOPY  2020    GOLD SEED FIDUCIAL PLACEMENT  04/11/2024    per Dr Bello    OTHER SURGICAL HISTORY      knee surgery    OTHER SURGICAL HISTORY      fravtured jaw    OTHER SURGICAL HISTORY  1990    RK (laser surgery)        Review of Systems   Respiratory:  Positive for shortness of breath.     A full 14 point review of systems was performed and was negative except as noted in the HPI.         Objective   VITAL SIGNS:   There were no vitals filed for this visit.     IPSS Questionnaire (AUA-7):  Over the past month…    1)  Incomplete Emptying  How often have you had a sensation of not emptying your bladder?  3 - About half the time   2)  Frequency  How often have you had to urinate less than every two hours? 3 - About half the time   3)  Intermittency  How often have you found you stopped and started again several times when you urinated?  2 - Less than half the time   4) Urgency  How often have you found it difficult to postpone urination?  1 - Less than 1 time in 5   5) Weak Stream  How often have you had a weak urinary stream?  1 - Less than 1 time in 5   6) Straining  How often have you had to push or strain to begin urination?  0 - Not at all   7) Nocturia  How many times did you typically get up at night to urinate?  3 - 3 times   Total Score:  13       Quality of life due to urinary symptoms:  If you were to spend the rest of your life with your urinary condition the way it is now, how would you feel about that? 2-Mostly Satisfied   Urine Leakage (Incontinence) 0-No Leakage     Sexual Health Inventory  Current Status    1)  How do you rate your confidence that you could  achieve and keep an erection? 5-Very High   2) When you had erections with sexual stimulation, how often were your erections hard enough for penetration (entering your partner)? 5-Almost always or always   3)  During sexual intercourse, how often were you able to maintain your erection after you had penetrated (entered) into your partner? 5-Almost always or always   4) During sexual intercourse, how difficult was it to maintain your erection to completion of intercourse? 5-Not difficult   5) When you attempted sexual intercourse, how often was it satisfactory to you? 5-Almost always or always   Total Score: 25       Bowel Health Inventory  Current Status: 0-No problems, no rectal bleeding, no discharge, less then 5 bowel movements a day            KPS       90%    Pulmonary:      Respirations even, unlabored. No audible wheezing or cough.  Neurological:      A+Ox4, conversant, answers questions appropriately.  Psychiatric:     Judgement, affect, and decision-making WNL.    Limited physical exam as visit was conducted remotely via telephone in light of the COVID-19 pandemic.          The following portions of the patient's history were reviewed and updated as appropriate: allergies, current medications, past family history, past medical history, past social history, past surgical history, and problem list.  I reviewed the patient's PSAs most recent one being 4.8  I reviewed the patient's MRI scan showing 85 cc gland with a PI-RADS 4 lesions right apex but no evidence of extracapsular extension.    I reviewed the patient's pathology from [facility] showing Yola 3+4 equal 7 disease.    I reviewed Dr. Bello's note.    Assessment      IMPRESSION:     Patient is a very pleasant 69-year-old gentleman with favorable intermediate risk adenocarcinoma of the prostate with a favorable genetic risk score but a relatively high risk appearing lesion on the PET scan.  The patient clinically has a cT1 cN0 M0 and a low PSA at  4.8.    The patient completed radiotherapy 5/28/2024 with CyberKnife.    Since then the patient's PSA came down in August to 1.3.  This is a very good response.  The patient has had some increased urinary frequency at night after discontinuing Flomax twice a day.  We talked about moving his Flomax to the evening that to see if that helps him more with his nocturia.  We also discussed potentially adding in another Flomax dose to make it twice daily again for him.  The patient has been sick and we did discuss several over-the-counter potential remedies for him.  He will also follow-up with his primary care doctor in the future.  We discussed the flu vaccine today and I hope he is able to avoid getting the flu this year.    Otherwise I will see the patient back in March after his next PSA with Dr. Bello.    I spent 20 minutes on the patient's case today.    RECOMMENDATIONS:      Adenocarcinoma the prostate  -Favorable intermediate risk  -CT 1 cN0 M0  -Yola 4+4 = 8 initially  -Yola 3+4 equal 7 from [facility]  -Pretreatment PSA 4.8 on 4K score  -PI-RADS MRI shows PI-RADS 4 lesion at the right apex  -GPS score 15  -Iodine allergy  -Status post CyberKnife 5 fractions pleated 5/28/2024  -PSA coming down nicely 1.3 as of 8/14/2024  -Has follow-up pending in March with Dr. Bello  -Amend telehealth after next PSA.    Nocturia  -Previously tolerated  Flomax twice daily well with minimal nocturia  -Has come down to Flomax daily in the morning but has more nocturia  -Will try moving Flomax to the evening to see if that helps otherwise could go back up to twice daily treatments  -Discussed oxybutynin with the patient as his flow rate is good but he wants to hold off at this time point  -Encouraged to call if he wants to try the medication.       Eddie Stone MD      Errors in dictation may reflect use of voice recognition software and not all errors in transcription may have been detected prior to signing.

## 2025-04-10 ENCOUNTER — TELEPHONE (OUTPATIENT)
Dept: RADIATION ONCOLOGY | Facility: HOSPITAL | Age: 70
End: 2025-04-10
Payer: MEDICARE

## 2025-04-10 NOTE — TELEPHONE ENCOUNTER
Pt wife left VM r/t appt- called pt wife and notified her Shanell is out this week and will contact her next week-verbalized understanding

## 2025-06-19 ENCOUNTER — HOSPITAL ENCOUNTER (OUTPATIENT)
Dept: RADIATION ONCOLOGY | Facility: HOSPITAL | Age: 70
Setting detail: RADIATION/ONCOLOGY SERIES
Discharge: HOME | End: 2025-06-19
Payer: MEDICARE

## 2025-06-19 ENCOUNTER — CLINICAL SUPPORT (OUTPATIENT)
Dept: RADIATION ONCOLOGY | Facility: HOSPITAL | Age: 70
End: 2025-06-19
Payer: MEDICARE

## 2025-06-19 DIAGNOSIS — C61 PROSTATE CANCER: Primary | ICD-10-CM

## 2025-06-19 NOTE — PROGRESS NOTES
FOLLOW UP NOTE    PATIENT:                                                      Bereket Andrade  MEDICAL RECORD #:                        [id number]  :                                                          1955  COMPLETION DATE:   2024  DIAGNOSIS:     Prostate cancer  - Stage IIB (cT1c, cN0, cM0, PSA: 4.8, Grade Group: 2)    This visit has been converted to a telehealth virtual visit, the patient's preferred method for today's follow-up.  Total time of discussion was <10 minutes.  The patient has given verbal consent.      BRIEF HISTORY:  Bereket Andrade is a very pleasant 70 y.o. male who was found to have an elevated PSA that was increasing over time up to a value of 4.8.  The patient had PI-RADS MRI scan that showed a lesion at the right apex concerning for PI-RADS 4 lesion.  The prostate gland measured 85 g.  The patient had a biopsy that came back initially as 1 in 12 cores positive for [place] 4+4 = 8.  The patient's pathology was sent for GPS score was found to be 15.  The patient's pathology was sent for review given the discordant genomic risk and pathology.  His review at [facility] came back Yola 3+4 = 7.     The patient then underwent definitive treatment with a course of CyberKnife SBRT, completing 2024.  He tolerated treatment well.  From a symptom standpoint, he reports that, moderate lower urinary tract symptoms are overall stable compared to baseline.  He does note some hesitancy with stream and bladder emptying early in the morning, but this resolves as the day goes on.  He is currently on Flomax qHS but was recently seen by Dr. Bello who recommended trialing BID dose of Flomax in the morning and evening.  The patient denies gross hematuria, dysuria, or urinary incontinence.  He reports bowel function is normal.  He denies concerns with ED.  No unexplained weight loss or new/focal bony pains.  Overall, he feels well.  Initial post-treatment PSA on 2024 dropped to  a value of 1.3, and has further declined to a more recent value on 6/16/2025 to a value of 0.37.    MEDICATIONS: Medication reconciliation for the patient was reviewed and confirmed in the electronic medical record.    Review of Systems   All other systems reviewed and are negative.    KPS 90%            Physical Exam  Pulmonary:      Respirations even, unlabored. No audible wheezing or cough.  Neurological:      A+Ox4, conversant, answers questions appropriately.  Psychiatric:     Judgement, affect, and decision-making WNL.    Limited physical exam as visit was conducted remotely via telephone.          The following portions of the patient's history were reviewed and updated as appropriate: allergies, current medications, past family history, past medical history, past social history, past surgical history and problem list.         Diagnoses and all orders for this visit:    1. Prostate cancer (Primary)    Other orders  -     LABS SCANNED         IMPRESSION:  The patient is a very pleasant 70 y.o. gentleman with history of a favorable intermediate risk adenocarcinoma of the prostate with a lower genetic risk score but a relatively high risk appearing lesion on imaging.  The patient clinically has  cT1 cN0 M0 disease and a low PSA at 4.8.    The patient completed CyberKnife SBRT 1 year ago.  Clinically, he is doing well and notes urinary function is no worse than prior.  He is on an alpha blocker which he plans to increase to BID dosing per recent urology recommendations to address some urinary hesitancy in the morning.  The patient has had an excellent biochemical response with decline in his PSA down to a zach value of 0.37, indicative of biochemical disease remission status.  Prognosis should remain very good at this point.  We discussed ongoing recommendations for routine urologic surveillance and serial PSA monitoring.  At this point, the patient will continue to see Dr. Bello for PSA monitoring and we will be  available on an as-needed basis, certainly in the event of 2 consecutive PSA rises 2 or more points above zach value.         RECOMMENDATIONS:      Adenocarcinoma of the prostate  -Favorable intermediate risk  -cT1c N0 M0  -Yola 4+4 = 8 initially  -Downstaging with [place] 3+4 = 7 from [facility]  -Pretreatment PSA 4.8 on 4K score  -PI-RADS MRI shows PI-RADS 4 lesion at the right apex  -GPS score 15  -Iodine allergy  -Status post CyberKnife 35 Gray/5 fractions   -completed 5/28/2024  -PSA 1.3 on 8/14/2024  -PSA 0.37 on 6/16/2025   -indicative of biochemical remission  -Recommend ongoing PSA surveillance q6-12 months per Dr. Bello  -Return to rad onc as needed      Return if symptoms worsen or fail to improve, for Office Visit.    HONORIO De La O      I spent a total of 20 minutes on today's visit, with <10 minutes in virtual communication with the patient via telephone, and the remainder of the time spent in reviewing the relevant history, records, available imaging, and for documentation.